# Patient Record
Sex: FEMALE | Race: WHITE | NOT HISPANIC OR LATINO | Employment: FULL TIME | URBAN - METROPOLITAN AREA
[De-identification: names, ages, dates, MRNs, and addresses within clinical notes are randomized per-mention and may not be internally consistent; named-entity substitution may affect disease eponyms.]

---

## 2017-06-15 ENCOUNTER — APPOINTMENT (OUTPATIENT)
Dept: WOMENS IMAGING | Facility: HOSPITAL | Age: 57
End: 2017-06-15

## 2017-06-15 PROCEDURE — G0202 SCR MAMMO BI INCL CAD: HCPCS | Performed by: RADIOLOGY

## 2017-06-15 PROCEDURE — 77067 SCR MAMMO BI INCL CAD: CPT | Performed by: RADIOLOGY

## 2017-06-15 PROCEDURE — 77063 BREAST TOMOSYNTHESIS BI: CPT | Performed by: RADIOLOGY

## 2017-09-05 ENCOUNTER — TRANSCRIBE ORDERS (OUTPATIENT)
Dept: ADMINISTRATIVE | Facility: HOSPITAL | Age: 57
End: 2017-09-05

## 2017-09-05 DIAGNOSIS — M25.551 RIGHT HIP PAIN: Primary | ICD-10-CM

## 2017-09-07 ENCOUNTER — HOSPITAL ENCOUNTER (OUTPATIENT)
Dept: GENERAL RADIOLOGY | Facility: HOSPITAL | Age: 57
Discharge: HOME | End: 2017-09-07
Attending: SPECIALIST | Admitting: SPECIALIST

## 2017-09-07 DIAGNOSIS — M25.551 RIGHT HIP PAIN: ICD-10-CM

## 2017-09-07 PROCEDURE — 0 IOPAMIDOL 61 % SOLUTION: Performed by: RADIOLOGY

## 2017-09-07 PROCEDURE — 77002 NEEDLE LOCALIZATION BY XRAY: CPT

## 2017-09-07 PROCEDURE — 25010000002 METHYLPREDNISOLONE PER 125 MG: Performed by: RADIOLOGY

## 2017-09-07 RX ORDER — BUPIVACAINE HYDROCHLORIDE 2.5 MG/ML
10 INJECTION, SOLUTION EPIDURAL; INFILTRATION; INTRACAUDAL ONCE
Status: COMPLETED | OUTPATIENT
Start: 2017-09-07 | End: 2017-09-07

## 2017-09-07 RX ORDER — METHYLPREDNISOLONE SODIUM SUCCINATE 125 MG/2ML
80 INJECTION, POWDER, LYOPHILIZED, FOR SOLUTION INTRAMUSCULAR; INTRAVENOUS
Status: COMPLETED | OUTPATIENT
Start: 2017-09-07 | End: 2017-09-07

## 2017-09-07 RX ORDER — LIDOCAINE HYDROCHLORIDE 10 MG/ML
10 INJECTION, SOLUTION INFILTRATION; PERINEURAL ONCE
Status: COMPLETED | OUTPATIENT
Start: 2017-09-07 | End: 2017-09-07

## 2017-09-07 RX ADMIN — METHYLPREDNISOLONE SODIUM SUCCINATE 80 MG: 125 INJECTION, POWDER, LYOPHILIZED, FOR SOLUTION INTRAMUSCULAR; INTRAVENOUS at 10:43

## 2017-09-07 RX ADMIN — LIDOCAINE HYDROCHLORIDE 2 ML: 10 INJECTION, SOLUTION INFILTRATION; PERINEURAL at 10:43

## 2017-09-07 RX ADMIN — IOPAMIDOL 2 ML: 612 INJECTION, SOLUTION INTRAVENOUS at 10:43

## 2017-09-07 RX ADMIN — BUPIVACAINE HYDROCHLORIDE 5 ML: 2.5 INJECTION, SOLUTION EPIDURAL; INFILTRATION; INTRACAUDAL; PERINEURAL at 10:43

## 2018-01-24 ENCOUNTER — APPOINTMENT (OUTPATIENT)
Dept: WOMENS IMAGING | Facility: HOSPITAL | Age: 58
End: 2018-01-24

## 2018-01-24 PROCEDURE — 77065 DX MAMMO INCL CAD UNI: CPT | Performed by: RADIOLOGY

## 2018-01-24 PROCEDURE — 77061 BREAST TOMOSYNTHESIS UNI: CPT | Performed by: RADIOLOGY

## 2018-01-24 PROCEDURE — 76641 ULTRASOUND BREAST COMPLETE: CPT | Performed by: RADIOLOGY

## 2018-01-24 PROCEDURE — MDREVIEWSP: Performed by: RADIOLOGY

## 2018-01-24 PROCEDURE — G0279 TOMOSYNTHESIS, MAMMO: HCPCS | Performed by: RADIOLOGY

## 2018-07-02 ENCOUNTER — APPOINTMENT (OUTPATIENT)
Dept: WOMENS IMAGING | Facility: HOSPITAL | Age: 58
End: 2018-07-02

## 2018-07-02 PROCEDURE — 77067 SCR MAMMO BI INCL CAD: CPT | Performed by: RADIOLOGY

## 2018-07-02 PROCEDURE — 77063 BREAST TOMOSYNTHESIS BI: CPT | Performed by: RADIOLOGY

## 2018-07-02 PROCEDURE — MDREVIEWSP: Performed by: RADIOLOGY

## 2019-07-16 ENCOUNTER — APPOINTMENT (OUTPATIENT)
Dept: WOMENS IMAGING | Facility: HOSPITAL | Age: 59
End: 2019-07-16

## 2019-07-16 PROCEDURE — MDREVIEWSP: Performed by: RADIOLOGY

## 2019-07-16 PROCEDURE — 77063 BREAST TOMOSYNTHESIS BI: CPT | Performed by: RADIOLOGY

## 2019-07-16 PROCEDURE — 77067 SCR MAMMO BI INCL CAD: CPT | Performed by: RADIOLOGY

## 2019-09-04 NOTE — PROGRESS NOTES
Subjective   History of Present Illness: Aliyah Lisa is a 58 y.o. female is here today for follow-up for back pain that radiates into her right leg. She just finished physical therapy for her knee surgery.  Sprained is about 18 months out from a lumbar laminectomy at L3-4 which was a revision from a surgery done a few weeks prior to that.  She was struggling after that surgery and ended up with a hip replacement and since then revision to that hip replacement earlier this year.  She just completed a left knee replacement and just managed physical therapy for that knee replacement.      He was on vacation out of town for the first time in 3 years with her  slept in a different bed and started having back and some right leg pain following the first night in the hotel bed.  She can hardly walk throughout the rest of the vacation because of the pain she followed up with Dr. Wilson who suggested follow-up with me.  She has had no additional work-up.  Denies any specific weakness or numbness in the lower extremities and states that activity tends to aggravate the condition.    Back Pain   This is a chronic problem. The current episode started more than 1 year ago. The problem occurs constantly. The pain is present in the lumbar spine. The quality of the pain is described as shooting and aching. The pain radiates to the right knee and right thigh. The pain is at a severity of 8/10. The pain is moderate. The pain is the same all the time. Exacerbated by: Ambulating. Stiffness is present all day. Pertinent negatives include no bladder incontinence, bowel incontinence or numbness. She has tried NSAIDs (Physical therapy) for the symptoms.       The following portions of the patient's history were reviewed and updated as appropriate: allergies, current medications, past family history, past medical history, past social history, past surgical history and problem list.    Review of Systems   Constitutional: Negative.   "  HENT: Negative.    Eyes: Negative.    Respiratory: Negative.  Negative for chest tightness and shortness of breath.    Cardiovascular: Negative.    Gastrointestinal: Negative.  Negative for bowel incontinence.   Endocrine: Negative.    Genitourinary: Negative.  Negative for bladder incontinence.   Musculoskeletal: Positive for back pain.   Skin: Negative.    Allergic/Immunologic: Negative.    Neurological: Negative.  Negative for numbness.   Hematological: Negative.    Psychiatric/Behavioral: Negative.    All other systems reviewed and are negative.      Objective     Vitals:    09/06/19 0854   BP: 132/79   Pulse: 85   Weight: 90.7 kg (200 lb)   Height: 170.2 cm (67\")     Body mass index is 31.32 kg/m².      Physical Exam  Neurologic Exam     Physical Exam:    CONSTITUTIONAL: This 58 year old right handed  female appears well developed, well-nourished and in no acute distress.    HEAD & FACE: the head and face are symmetric, normocephalic and atraumatic.    EYES: Inspection of the conjunctivae and lids reveals no swelling, erythema or discharge.  Pupils are round, equal and reactive to light and there is no scleral icterus.    EARS, NOSE, MOUTH & THROAT: On inspection, the ears and nose are within normal limits.    NECK: the neck is supple and symmetric. The trachea is midline with no masses.    PULMONARY: Respiratory effort is normal with no increased work of breathing or signs of respiratory distress.    CARDIOVASCULAR: Pedal pulses are +2/4 bilaterally. Examination of the extremities shows no edema or varicosities.    LYMPHATIC: There is no palpable lymphadenopathy of the neck.    MUSCULOSKELETAL: Gait and station some limping with respect to her orthopedic issues in the legs but she appears to be mobilizing much better than I documented in the past. The spine has normal alignment and range of motion, no palpable tenderness in low back    SKIN: The skin is warm, dry and intact, bilateral knee incisions " and low back incision healed    NEUROLOGIC:   Cranial Nerves 2-12 intact  Normal motor strength noted. Muscle bulk and tone are normal.  Sensory exam is normal to all modalities.  Reflexes on the right side demonstrates 1/4 Triceps Reflex, 1/4 Biceps Reflex, 1/4 Brachioradialis Reflex, 1/4 Knee Jerk Reflex, 0/4 Ankle Jerk Reflex and no ankle clonus on the right.   Reflexes on the left side demonstrates 1/4 Triceps Reflex, 1/4 Biceps Reflex, 1/4 Brachioradialis Reflex, 1/4 Knee Jerk Reflex, 0/4 Ankle Jerk Reflex and no ankle clonus on the left.  Superficial/Primitive Reflexes: primitive reflexes were absent.  No coordination deficit observed.  Radicular testing showed a negative Ernie (ISAK) test and negative straight leg raise bilaterally.  Cortical function is intact and without deficits. Speech is normal.    PSYCHIATRIC: oriented to person, place and time. Patient's mood and affect are normal.      Assessment/Plan   Independent Review of Radiographic Studies:      I personally reviewed the images from the following studies.    No new spinal imaging    Medical Decision Making:      I have not identified any specific radiculopathy on my neurologic exam and she had a definitive laminectomy at L3-4 last year some of the persistent symptoms although she ultimately ended up with a hip revision surgery for some of the residual symptoms following a lumbar laminectomy.  Back pain, specifically associated with travel, is not unusual following her back surgery and the chronic back problems she had long before I saw her for the disc problem.  I am compelled to get an MRI of the lumbar spine with and without contrast as well as flexion extension lumbar films if surgical pathology is identified to get her in with a spinal subspecialist either the [facility] or the [facility] spine [facility].  If the MRI and plain films are reassuring she will follow-up with her orthopedist regarding the other joint problems she  has and how they may be contributing to her recurrent problems.  I will see her back with the new imaging.    Aliyah was seen today for back pain.    Diagnoses and all orders for this visit:    Chronic midline low back pain, with sciatica presence unspecified  -     MRI Lumbar Spine With & Without Contrast; Future  -     XR Spine Lumbar Complete With Flex & Ext; Future    Right leg pain  -     MRI Lumbar Spine With & Without Contrast; Future  -     XR Spine Lumbar Complete With Flex & Ext; Future      Return for review of completed images.           William Ling MD FACS [facility]  Neurological Surgery

## 2019-09-06 ENCOUNTER — TELEPHONE (OUTPATIENT)
Dept: NEUROSURGERY | Facility: CLINIC | Age: 59
End: 2019-09-06

## 2019-09-06 ENCOUNTER — OFFICE VISIT (OUTPATIENT)
Dept: NEUROSURGERY | Facility: CLINIC | Age: 59
End: 2019-09-06

## 2019-09-06 VITALS
SYSTOLIC BLOOD PRESSURE: 132 MMHG | HEART RATE: 85 BPM | WEIGHT: 200 LBS | BODY MASS INDEX: 31.39 KG/M2 | DIASTOLIC BLOOD PRESSURE: 79 MMHG | HEIGHT: 67 IN

## 2019-09-06 DIAGNOSIS — M79.604 RIGHT LEG PAIN: ICD-10-CM

## 2019-09-06 DIAGNOSIS — G89.29 CHRONIC MIDLINE LOW BACK PAIN, WITH SCIATICA PRESENCE UNSPECIFIED: Primary | ICD-10-CM

## 2019-09-06 DIAGNOSIS — M54.5 CHRONIC MIDLINE LOW BACK PAIN, WITH SCIATICA PRESENCE UNSPECIFIED: Primary | ICD-10-CM

## 2019-09-06 PROBLEM — M54.50 CHRONIC LOW BACK PAIN: Status: ACTIVE | Noted: 2019-09-06

## 2019-09-06 PROCEDURE — 99213 OFFICE O/P EST LOW 20 MIN: CPT | Performed by: NEUROLOGICAL SURGERY

## 2019-09-06 RX ORDER — IRBESARTAN 300 MG/1
300 TABLET ORAL EVERY MORNING
Refills: 0 | COMMUNITY
Start: 2019-08-30

## 2019-09-06 RX ORDER — MELATONIN
1000 DAILY
COMMUNITY

## 2019-09-06 RX ORDER — AMLODIPINE BESYLATE 10 MG/1
10 TABLET ORAL EVERY MORNING
Refills: 0 | COMMUNITY
Start: 2019-08-13

## 2019-09-06 RX ORDER — ASPIRIN 81 MG/1
81 TABLET ORAL DAILY
COMMUNITY

## 2019-09-06 RX ORDER — PITAVASTATIN CALCIUM 2.09 MG/1
2 TABLET, FILM COATED ORAL
Refills: 1 | COMMUNITY
Start: 2019-06-19

## 2019-09-06 RX ORDER — NAPROXEN 500 MG/1
500 TABLET ORAL 2 TIMES DAILY WITH MEALS
Refills: 0 | COMMUNITY
Start: 2019-07-31

## 2019-09-06 RX ORDER — FERROUS SULFATE 325(65) MG
1 TABLET ORAL DAILY
Refills: 0 | COMMUNITY
Start: 2019-08-16

## 2019-09-06 RX ORDER — LEVOTHYROXINE SODIUM 0.1 MG/1
100 TABLET ORAL EVERY MORNING
Refills: 0 | COMMUNITY
Start: 2019-08-18

## 2019-09-06 NOTE — TELEPHONE ENCOUNTER
Patient called the office after she had just seen Dr. Ling and wanted to know about a sooner appointment. I told her we scheduled it a month out to give her time to get her testing that Dr. Ling ordered complete and us to receive the results. She verbalized understanding.

## 2019-09-17 ENCOUNTER — HOSPITAL ENCOUNTER (OUTPATIENT)
Dept: GENERAL RADIOLOGY | Facility: HOSPITAL | Age: 59
Discharge: HOME | End: 2019-09-17

## 2019-09-17 ENCOUNTER — HOSPITAL ENCOUNTER (OUTPATIENT)
Dept: MRI IMAGING | Facility: HOSPITAL | Age: 59
Discharge: HOME | End: 2019-09-17
Admitting: NEUROLOGICAL SURGERY

## 2019-09-17 DIAGNOSIS — G89.29 CHRONIC MIDLINE LOW BACK PAIN, WITH SCIATICA PRESENCE UNSPECIFIED: ICD-10-CM

## 2019-09-17 DIAGNOSIS — M54.5 CHRONIC MIDLINE LOW BACK PAIN, WITH SCIATICA PRESENCE UNSPECIFIED: ICD-10-CM

## 2019-09-17 DIAGNOSIS — M79.604 RIGHT LEG PAIN: ICD-10-CM

## 2019-09-17 LAB — CREAT BLDA-MCNC: 0.9 MG/DL (ref 0.6–1.3)

## 2019-09-17 PROCEDURE — A9577 INJ MULTIHANCE: HCPCS | Performed by: NEUROLOGICAL SURGERY

## 2019-09-17 PROCEDURE — 0 GADOBENATE DIMEGLUMINE 529 MG/ML SOLUTION: Performed by: NEUROLOGICAL SURGERY

## 2019-09-17 PROCEDURE — 72114 X-RAY EXAM L-S SPINE BENDING: CPT

## 2019-09-17 PROCEDURE — 82565 ASSAY OF CREATININE: CPT

## 2019-09-17 PROCEDURE — 72158 MRI LUMBAR SPINE W/O & W/DYE: CPT

## 2019-09-17 RX ADMIN — GADOBENATE DIMEGLUMINE 19 ML: 529 INJECTION, SOLUTION INTRAVENOUS at 14:52

## 2019-10-04 ENCOUNTER — OFFICE VISIT (OUTPATIENT)
Dept: NEUROSURGERY | Facility: CLINIC | Age: 59
End: 2019-10-04

## 2019-10-04 VITALS — DIASTOLIC BLOOD PRESSURE: 92 MMHG | HEART RATE: 88 BPM | SYSTOLIC BLOOD PRESSURE: 145 MMHG

## 2019-10-04 DIAGNOSIS — M54.50 CHRONIC MIDLINE LOW BACK PAIN, UNSPECIFIED WHETHER SCIATICA PRESENT: ICD-10-CM

## 2019-10-04 DIAGNOSIS — M43.16 SPONDYLOLISTHESIS, LUMBAR REGION: ICD-10-CM

## 2019-10-04 DIAGNOSIS — M79.604 RIGHT LEG PAIN: Primary | ICD-10-CM

## 2019-10-04 DIAGNOSIS — G89.29 CHRONIC MIDLINE LOW BACK PAIN, UNSPECIFIED WHETHER SCIATICA PRESENT: ICD-10-CM

## 2019-10-04 PROCEDURE — 99213 OFFICE O/P EST LOW 20 MIN: CPT | Performed by: NEUROLOGICAL SURGERY

## 2020-07-22 ENCOUNTER — APPOINTMENT (OUTPATIENT)
Dept: WOMENS IMAGING | Facility: HOSPITAL | Age: 60
End: 2020-07-22

## 2020-07-22 PROCEDURE — 77067 SCR MAMMO BI INCL CAD: CPT | Performed by: RADIOLOGY

## 2020-07-22 PROCEDURE — 77063 BREAST TOMOSYNTHESIS BI: CPT | Performed by: RADIOLOGY

## 2021-03-19 ENCOUNTER — BULK ORDERING (OUTPATIENT)
Dept: CASE MANAGEMENT | Facility: OTHER | Age: 61
End: 2021-03-19

## 2021-03-19 DIAGNOSIS — Z23 IMMUNIZATION DUE: ICD-10-CM

## 2021-08-11 ENCOUNTER — APPOINTMENT (OUTPATIENT)
Dept: WOMENS IMAGING | Facility: HOSPITAL | Age: 61
End: 2021-08-11

## 2021-08-11 PROCEDURE — 77063 BREAST TOMOSYNTHESIS BI: CPT | Performed by: RADIOLOGY

## 2021-08-11 PROCEDURE — 77067 SCR MAMMO BI INCL CAD: CPT | Performed by: RADIOLOGY

## 2022-08-22 ENCOUNTER — APPOINTMENT (OUTPATIENT)
Dept: WOMENS IMAGING | Facility: HOSPITAL | Age: 62
End: 2022-08-22

## 2022-08-22 PROCEDURE — 77067 SCR MAMMO BI INCL CAD: CPT | Performed by: RADIOLOGY

## 2022-08-22 PROCEDURE — 77063 BREAST TOMOSYNTHESIS BI: CPT | Performed by: RADIOLOGY

## 2023-10-27 ENCOUNTER — APPOINTMENT (OUTPATIENT)
Dept: WOMENS IMAGING | Facility: HOSPITAL | Age: 63
End: 2023-10-27
Payer: COMMERCIAL

## 2023-10-27 PROCEDURE — 77067 SCR MAMMO BI INCL CAD: CPT | Performed by: RADIOLOGY

## 2023-10-27 PROCEDURE — 77063 BREAST TOMOSYNTHESIS BI: CPT | Performed by: RADIOLOGY

## 2023-11-10 ENCOUNTER — APPOINTMENT (OUTPATIENT)
Dept: WOMENS IMAGING | Facility: HOSPITAL | Age: 63
End: 2023-11-10
Payer: COMMERCIAL

## 2023-11-10 PROCEDURE — G0279 TOMOSYNTHESIS, MAMMO: HCPCS | Performed by: RADIOLOGY

## 2023-11-10 PROCEDURE — 77061 BREAST TOMOSYNTHESIS UNI: CPT | Performed by: RADIOLOGY

## 2023-11-10 PROCEDURE — 76642 ULTRASOUND BREAST LIMITED: CPT | Performed by: RADIOLOGY

## 2023-11-10 PROCEDURE — 77065 DX MAMMO INCL CAD UNI: CPT | Performed by: RADIOLOGY

## 2023-11-21 ENCOUNTER — LAB REQUISITION (OUTPATIENT)
Dept: LAB | Facility: HOSPITAL | Age: 63
End: 2023-11-21
Payer: COMMERCIAL

## 2023-11-21 ENCOUNTER — APPOINTMENT (OUTPATIENT)
Dept: WOMENS IMAGING | Facility: HOSPITAL | Age: 63
End: 2023-11-21
Payer: COMMERCIAL

## 2023-11-21 DIAGNOSIS — N63.0 LUMP OR MASS IN BREAST: ICD-10-CM

## 2023-11-21 PROCEDURE — 88305 TISSUE EXAM BY PATHOLOGIST: CPT | Performed by: FAMILY MEDICINE

## 2023-11-21 PROCEDURE — 19081 BX BREAST 1ST LESION STRTCTC: CPT | Performed by: RADIOLOGY

## 2023-11-21 PROCEDURE — C1819 TISSUE LOCALIZATION-EXCISION: HCPCS | Performed by: RADIOLOGY

## 2023-11-21 PROCEDURE — A4648 IMPLANTABLE TISSUE MARKER: HCPCS | Performed by: RADIOLOGY

## 2023-11-28 LAB
DX PRELIMINARY: NORMAL
LAB AP CASE REPORT: NORMAL
PATH REPORT.FINAL DX SPEC: NORMAL
PATH REPORT.GROSS SPEC: NORMAL

## 2024-05-21 ENCOUNTER — APPOINTMENT (OUTPATIENT)
Dept: WOMENS IMAGING | Facility: HOSPITAL | Age: 64
End: 2024-05-21
Payer: COMMERCIAL

## 2024-05-21 PROCEDURE — G0279 TOMOSYNTHESIS, MAMMO: HCPCS | Performed by: RADIOLOGY

## 2024-05-21 PROCEDURE — 77065 DX MAMMO INCL CAD UNI: CPT | Performed by: RADIOLOGY

## 2024-05-21 PROCEDURE — 77061 BREAST TOMOSYNTHESIS UNI: CPT | Performed by: RADIOLOGY

## 2025-05-22 ENCOUNTER — APPOINTMENT (OUTPATIENT)
Dept: WOMENS IMAGING | Facility: HOSPITAL | Age: 65
End: 2025-05-22
Payer: MEDICARE

## 2025-05-22 PROCEDURE — 77067 SCR MAMMO BI INCL CAD: CPT | Performed by: RADIOLOGY

## 2025-05-22 PROCEDURE — 77063 BREAST TOMOSYNTHESIS BI: CPT | Performed by: RADIOLOGY
